# Patient Record
Sex: MALE | Race: WHITE | Employment: FULL TIME
[De-identification: names, ages, dates, MRNs, and addresses within clinical notes are randomized per-mention and may not be internally consistent; named-entity substitution may affect disease eponyms.]

---

## 2017-02-17 DIAGNOSIS — F41.9 ANXIETY: Primary | ICD-10-CM

## 2017-02-17 DIAGNOSIS — F17.200 SMOKES AND MOTIVATED TO QUIT: ICD-10-CM

## 2017-02-17 RX ORDER — BUSPIRONE HYDROCHLORIDE 5 MG/1
5 TABLET ORAL 2 TIMES DAILY
Qty: 60 TABLET | Refills: 0 | Status: SHIPPED | OUTPATIENT
Start: 2017-02-17 | End: 2018-02-07 | Stop reason: SDUPTHER

## 2017-02-17 RX ORDER — VARENICLINE TARTRATE 1 MG/1
1 TABLET, FILM COATED ORAL 2 TIMES DAILY
Qty: 60 TABLET | Refills: 3 | Status: SHIPPED | OUTPATIENT
Start: 2017-02-17 | End: 2018-01-05

## 2017-02-23 ENCOUNTER — PATIENT MESSAGE (OUTPATIENT)
Dept: FAMILY MEDICINE CLINIC | Facility: CLINIC | Age: 27
End: 2017-02-23

## 2017-02-23 DIAGNOSIS — L03.119 CELLULITIS OF THIGH: Primary | ICD-10-CM

## 2017-02-23 RX ORDER — CEPHALEXIN 500 MG/1
500 CAPSULE ORAL EVERY 6 HOURS
Qty: 40 CAPSULE | Refills: 0 | Status: SHIPPED | OUTPATIENT
Start: 2017-02-23 | End: 2017-03-05

## 2018-01-05 ENCOUNTER — PATIENT MESSAGE (OUTPATIENT)
Dept: FAMILY MEDICINE CLINIC | Age: 28
End: 2018-01-05

## 2018-01-05 DIAGNOSIS — F17.200 SMOKES AND MOTIVATED TO QUIT: Primary | ICD-10-CM

## 2018-01-05 RX ORDER — BUPROPION HYDROCHLORIDE 150 MG/1
150 TABLET ORAL EVERY MORNING
Qty: 30 TABLET | Refills: 3 | Status: SHIPPED | OUTPATIENT
Start: 2018-01-05 | End: 2018-02-12 | Stop reason: SDUPTHER

## 2018-01-05 NOTE — TELEPHONE ENCOUNTER
From: Cami Askew  To: Ariel Vázquez MD  Sent: 1/5/2018 8:45 AM EST  Subject: Prescription Question    Is there anything else out there to help with smoking?  My insurance stopped cover it and are wanting me to pay 300+ dollars

## 2018-02-07 ENCOUNTER — OFFICE VISIT (OUTPATIENT)
Dept: FAMILY MEDICINE CLINIC | Age: 28
End: 2018-02-07
Payer: COMMERCIAL

## 2018-02-07 VITALS
RESPIRATION RATE: 18 BRPM | DIASTOLIC BLOOD PRESSURE: 99 MMHG | HEIGHT: 71 IN | HEART RATE: 99 BPM | WEIGHT: 267 LBS | SYSTOLIC BLOOD PRESSURE: 149 MMHG | TEMPERATURE: 98 F | BODY MASS INDEX: 37.38 KG/M2

## 2018-02-07 DIAGNOSIS — Z80.0 FAMILY HISTORY OF COLON CANCER: ICD-10-CM

## 2018-02-07 DIAGNOSIS — Z23 NEED FOR DIPHTHERIA-TETANUS-PERTUSSIS (TDAP) VACCINE: ICD-10-CM

## 2018-02-07 DIAGNOSIS — E66.9 OBESITY (BMI 30-39.9): ICD-10-CM

## 2018-02-07 DIAGNOSIS — Z13.6 SCREENING FOR CARDIOVASCULAR CONDITION: ICD-10-CM

## 2018-02-07 DIAGNOSIS — F41.9 ANXIETY: ICD-10-CM

## 2018-02-07 DIAGNOSIS — Z00.00 ANNUAL PHYSICAL EXAM: Primary | ICD-10-CM

## 2018-02-07 DIAGNOSIS — I10 ESSENTIAL HYPERTENSION: ICD-10-CM

## 2018-02-07 DIAGNOSIS — Z82.49 FAMILY HISTORY OF EARLY CAD: ICD-10-CM

## 2018-02-07 PROCEDURE — 99395 PREV VISIT EST AGE 18-39: CPT | Performed by: FAMILY MEDICINE

## 2018-02-07 RX ORDER — BUSPIRONE HYDROCHLORIDE 10 MG/1
10 TABLET ORAL 2 TIMES DAILY PRN
Qty: 60 TABLET | Refills: 2 | Status: SHIPPED | OUTPATIENT
Start: 2018-02-07 | End: 2020-05-19

## 2018-02-07 RX ORDER — ATENOLOL 25 MG/1
25 TABLET ORAL DAILY
Qty: 30 TABLET | Refills: 0 | Status: SHIPPED | OUTPATIENT
Start: 2018-02-07 | End: 2018-02-14 | Stop reason: SDUPTHER

## 2018-02-07 ASSESSMENT — ENCOUNTER SYMPTOMS
RHINORRHEA: 0
WHEEZING: 0
VOMITING: 0
CONSTIPATION: 0
SHORTNESS OF BREATH: 0
NAUSEA: 0
COUGH: 0
SINUS PAIN: 0
ABDOMINAL PAIN: 0
ABDOMINAL DISTENTION: 0
CHEST TIGHTNESS: 0
DIARRHEA: 0

## 2018-02-07 ASSESSMENT — PATIENT HEALTH QUESTIONNAIRE - PHQ9
SUM OF ALL RESPONSES TO PHQ QUESTIONS 1-9: 0
2. FEELING DOWN, DEPRESSED OR HOPELESS: 0
SUM OF ALL RESPONSES TO PHQ9 QUESTIONS 1 & 2: 0
1. LITTLE INTEREST OR PLEASURE IN DOING THINGS: 0

## 2018-02-07 NOTE — PROGRESS NOTES
DIABETES and HYPERTENSION visit    BP Readings from Last 3 Encounters:   10/27/16 147/83   10/21/16 (!) 154/96   12/30/15 145/88      No results found for: LABA1C, LABMICR, LDLCHOLESTEROL, LDLCALC, HDL, BUN, CREATININE, GLUCOSE         Have you changed or started any medications since your last visit including any over-the-counter medicines, vitamins, or herbal medicines? no   Have you stopped taking any of your medications? Is so, why? -  Yes, all of them  Are you having any side effects from any of your medications? - no    Have you seen any other physician or provider since your last visit?  no   Have you had any other diagnostic tests since your last visit?  no   Have you been seen in the emergency room and/or had an admission in a hospital since we last saw you? No--urgent care   Have you had your routine dental cleaning in the past 6 months?  no     Have you had your annual diabetic retinal (eye) exam? no  (ensure copy of exam is in the chart)    Do you have an active InvoTekhart account? If no, what is the barrier?   Yes    Patient Care Team:  Alexander Treadwell MD as PCP - General (Family Medicine)    Medical History Review  Past Medical, Family, and Social History reviewed and does contribute to the patient presenting condition    Health Maintenance   Topic Date Due    Potassium monitoring  1989    Creatinine monitoring  1989    HIV screen  12/09/2004    DTaP/Tdap/Td vaccine (1 - Tdap) 12/09/2008    Flu vaccine (1) 09/01/2017

## 2018-02-07 NOTE — PATIENT INSTRUCTIONS
social groups, or volunteer to help others. Being alone sometimes makes things seem worse than they are. · Get at least 30 minutes of exercise on most days of the week to relieve stress. Walking is a good choice. You also may want to do other activities, such as running, swimming, cycling, or playing tennis or team sports. Relaxation techniques  Do relaxation exercises 10 to 20 minutes a day. You can play soothing, relaxing music while you do them, if you wish. · Tell others in your house that you are going to do your relaxation exercises. Ask them not to disturb you. · Find a comfortable place, away from all distractions and noise. · Lie down on your back, or sit with your back straight. · Focus on your breathing. Make it slow and steady. · Breathe in through your nose. Breathe out through either your nose or mouth. · Breathe deeply, filling up the area between your navel and your rib cage. Breathe so that your belly goes up and down. · Do not hold your breath. · Breathe like this for 5 to 10 minutes. Notice the feeling of calmness throughout your whole body. As you continue to breathe slowly and deeply, relax by doing the following for another 5 to 10 minutes:  · Tighten and relax each muscle group in your body. You can begin at your toes and work your way up to your head. · Imagine your muscle groups relaxing and becoming heavy. · Empty your mind of all thoughts. · Let yourself relax more and more deeply. · Become aware of the state of calmness that surrounds you. · When your relaxation time is over, you can bring yourself back to alertness by moving your fingers and toes and then your hands and feet and then stretching and moving your entire body. Sometimes people fall asleep during relaxation, but they usually wake up shortly afterward. · Always give yourself time to return to full alertness before you drive a car or do anything that might cause an accident if you are not fully alert.  Never sugars to 5 servings or less a week. A serving is 1 tablespoon jelly or jam, ½ cup sorbet, or 1 cup of lemonade. · Eat less than 2,300 milligrams (mg) of sodium a day. If you limit your sodium to 1,500 mg a day, you can lower your blood pressure even more. Tips for success  · Start small. Do not try to make dramatic changes to your diet all at once. You might feel that you are missing out on your favorite foods and then be more likely to not follow the plan. Make small changes, and stick with them. Once those changes become habit, add a few more changes. · Try some of the following:  ¨ Make it a goal to eat a fruit or vegetable at every meal and at snacks. This will make it easy to get the recommended amount of fruits and vegetables each day. ¨ Try yogurt topped with fruit and nuts for a snack or healthy dessert. ¨ Add lettuce, tomato, cucumber, and onion to sandwiches. ¨ Combine a ready-made pizza crust with low-fat mozzarella cheese and lots of vegetable toppings. Try using tomatoes, squash, spinach, broccoli, carrots, cauliflower, and onions. ¨ Have a variety of cut-up vegetables with a low-fat dip as an appetizer instead of chips and dip. ¨ Sprinkle sunflower seeds or chopped almonds over salads. Or try adding chopped walnuts or almonds to cooked vegetables. ¨ Try some vegetarian meals using beans and peas. Add garbanzo or kidney beans to salads. Make burritos and tacos with mashed jon beans or black beans. Where can you learn more? Go to https://PoshlypeSolarus.Imagry. org and sign in to your Global Animationz account. Enter M378 in the [facility] box to learn more about \"DASH Diet: Care Instructions. \"     If you do not have an account, please click on the \"Sign Up Now\" link. Current as of: September 21, 2016  Content Version: 11.5  © 7702-6634 Healthwise, Comprimato. Care instructions adapted under license by [facility] ([facility]).  If you have questions about a medical condition or this (STIs). Ask whether you should have tests for STIs. You may be at risk if you have sex with more than one person, especially if your partners do not wear condoms. For men  · Tests for sexually transmitted infections (STIs). Ask whether you should have tests for STIs. You may be at risk if you have sex with more than one person, especially if you do not wear a condom. · Testicular cancer exam. Ask your doctor whether you should check your testicles regularly. · Prostate exam. Talk to your doctor about whether you should have a blood test (called a PSA test) for prostate cancer. Experts differ on whether and when men should have this test. Some experts suggest it if you are older than 39 and are -American or have a father or brother who got prostate cancer when he was younger than 72. When should you call for help? Watch closely for changes in your health, and be sure to contact your doctor if you have any problems or symptoms that concern you. Where can you learn more? Go to https://Local Plant Sourcekyle.healthFormspring. org and sign in to your marshallindex account. Enter P072 in the [facility] box to learn more about \"Well Visit, Ages 25 to 48: Care Instructions. \"     If you do not have an account, please click on the \"Sign Up Now\" link. Current as of: May 12, 2017  Content Version: 11.5  © 7136-1047 Healthwise, Incorporated. Care instructions adapted under license by [facility] [address] If you have questions about a medical condition or this instruction, always ask your healthcare professional. [place] [address] any warranty or liability for your use of this information.

## 2018-02-07 NOTE — PROGRESS NOTES
FERRITIN    No results found for: VITD25      HIV done , in [place], patient declines to have it rechecked        Past Medical History:   Diagnosis Date    Anxiety 2/7/2018    Gastroesophageal reflux disease without esophagitis 10/22/2016    HTN (hypertension) 11/30/15    Obesity      Past Surgical History:   Procedure Laterality Date    COLONOSCOPY  1998    rectal bleeding, no anesthesia, lasted 5 minutes, will clarify with mom if it was really a colonoscopy or not    TONSILLECTOMY  1996    UPPER GASTROINTESTINAL ENDOSCOPY  2005     Family History   Problem Relation Age of Onset    Diabetes Mother 35    High Blood Pressure Mother     Diabetes Father     High Blood Pressure Father     Heart Attack Father 39    Diabetes Maternal Grandmother     High Blood Pressure Maternal Grandmother     Colon Cancer Maternal Grandmother 58    Diabetes Maternal Grandfather     Heart Attack Maternal Grandfather 48     X 2     High Blood Pressure Maternal Grandfather     Stroke Maternal Grandfather     Heart Attack Paternal Grandmother     Cancer Paternal Grandmother      unknown what type/age    High Blood Pressure Paternal Grandmother     Heart Attack Paternal Grandfather     High Blood Pressure Paternal Grandfather     Heart Disease Paternal Grandfather      Social History   Substance Use Topics    Smoking status: Former Smoker     Packs/day: 1.50     Years: 8.00     Types: Cigarettes     Start date: 1/1/2007     Quit date: 6/1/2013    Smokeless tobacco: Never Used      Comment: trying to quit, uses electronic cig with nicotine, Chantix;QUIT smoking 3/2016; restarted smoking Sept 2016    Alcohol use 12.0 oz/week     20 Cans of beer per week     -vital signs stable and within normal limits except high BP, mild tachycardia   and Obesity per BMI.      BP (!) 149/99   Pulse 99   Temp 98 °F (36.7 °C) (Oral)   Resp 18   Ht 5' 11\" (1.803 m)   Wt 267 lb (121.1 kg)   BMI 37.24 kg/m²   Body mass index is 37.24 kg/m². Current Outpatient Prescriptions   Medication Sig Dispense Refill    buPROPion (WELLBUTRIN XL) 150 MG extended release tablet Take 1 tablet by mouth every morning 30 tablet 3    mupirocin (BACTROBAN) 2 % ointment Apply topically 3 times daily on the affected area 1 Tube 0    busPIRone (BUSPAR) 5 MG tablet Take 1 tablet by mouth 2 times daily 60 tablet 0    diltiazem (CARTIA XT) 240 MG extended release capsule Take 1 capsule by mouth daily STOP CARDIZEM 180 MG 30 capsule 3    cetirizine (ZYRTEC ALLERGY) 10 MG tablet Take 1 tablet by mouth daily 30 tablet 3    fluticasone (FLONASE) 50 MCG/ACT nasal spray 2 sprays by Nasal route daily 1 Bottle 0    omeprazole (PRILOSEC) 20 MG delayed release capsule Take 1 capsule by mouth Daily 30 capsule 3    Blood Pressure KIT Dx: HTN. Needs automatic blood pressure machine to monitor his blood pressure. 1 kit 0    Multiple Vitamins-Minerals (THERAPEUTIC MULTIVITAMIN-MINERALS) tablet Take 1 tablet by mouth daily      aspirin 81 MG chewable tablet Take 81 mg by mouth daily       No current facility-administered medications for this visit.              -rest of complaints with corresponding details per ROS    The patient's past medical, surgical, social, and family history as well as his current medications and allergies were reviewed as documented in today's encounter. Review of Systems   Constitutional: Negative for activity change, appetite change, chills, diaphoresis, fatigue, fever and unexpected weight change. HENT: Positive for postnasal drip. Negative for congestion, dental problem, hearing loss, rhinorrhea and sinus pain. Eyes: Positive for visual disturbance (has glasses). Respiratory: Negative for cough, chest tightness, shortness of breath and wheezing. Cardiovascular: Positive for palpitations. Negative for chest pain and leg swelling.         Palpitations when having anxiety    Gastrointestinal: Negative for abdominal distention, abdominal pain, constipation, diarrhea, nausea and vomiting. Endocrine: Negative for cold intolerance, heat intolerance, polydipsia, polyphagia and polyuria. Genitourinary: Negative for difficulty urinating and frequency. Musculoskeletal: Negative for arthralgias. Skin: Negative for rash. Neurological: Negative for dizziness and headaches. Psychiatric/Behavioral: Negative for dysphoric mood, self-injury and sleep disturbance. The patient is nervous/anxious. Physical Exam   Constitutional: He is oriented to person, place, and time. He appears well-developed and well-nourished. No distress. HENT:   Head: Normocephalic and atraumatic. Right Ear: No middle ear effusion. Left Ear:  No middle ear effusion. Nose: Mucosal edema present. No rhinorrhea. Right sinus exhibits no maxillary sinus tenderness and no frontal sinus tenderness. Left sinus exhibits no maxillary sinus tenderness and no frontal sinus tenderness. Mouth/Throat: Posterior oropharyngeal erythema present. Advised to let me know if he wants to restart antihistaminic at any time   Eyes: Conjunctivae and EOM are normal. Right eye exhibits no discharge. Left eye exhibits no discharge. No scleral icterus. Neck: Normal range of motion. Neck supple. No thyromegaly present. Cardiovascular: Normal rate, regular rhythm, normal heart sounds and intact distal pulses. No murmur heard. Pulmonary/Chest: Effort normal and breath sounds normal. No respiratory distress. He has no wheezes. He has no rales. He exhibits no tenderness. Abdominal: Soft. Bowel sounds are normal. He exhibits no distension. There is no tenderness. Obese abdomen. Musculoskeletal: Normal range of motion. He exhibits no edema or tenderness. Neurological: He is alert and oriented to person, place, and time. He has normal reflexes. No cranial nerve deficit. He exhibits normal muscle tone.  Coordination normal.   Skin: Skin is warm and 3 month(s) with PCP. Provider spent 13 minutes counseling patient. 5. Screening for cardiovascular condition  - Lipid Panel; Future    6. Need for diphtheria-tetanus-pertussis (Tdap) vaccine  - Tetanus-Diphth-Acell Pertussis (BOOSTRIX) 5-2.5-18.5 LF-MCG/0.5 injection; Inject 0.5 mLs into the muscle once for 1 dose  Dispense: 0.5 mL; Refill: 0    7. Family history of early CAD, father had MI at 40 yo  - EKG 12 Lead; Future    8. Family history of colon cancer in [place]  Advised to start colonoscopy at 35years old. Orders Placed This Encounter   Procedures    CBC     Standing Status:   Future     Standing Expiration Date:   2/7/2019    Comprehensive Metabolic Panel     Standing Status:   Future     Standing Expiration Date:   2/7/2019    TSH without Reflex     Standing Status:   Future     Standing Expiration Date:   2/8/2019    Lipid Panel     Standing Status:   Future     Standing Expiration Date:   2/7/2019     Order Specific Question:   Is Patient Fasting?/# of Hours     Answer:   8-10 Hours    EKG 12 Lead     Standing Status:   Future     Standing Expiration Date:   2/7/2019     Scheduling Instructions:      fam history of early CAD     Order Specific Question:   Reason for Exam?     Answer:   Hypertension     Order Specific Question:   Reason for Exam?     Answer:    Other       Medications Discontinued During This Encounter   Medication Reason    mupirocin (BACTROBAN) 2 % ointment Therapy completed    diltiazem (CARTIA XT) 240 MG extended release capsule Alternate therapy    cetirizine (ZYRTEC ALLERGY) 10 MG tablet Therapy completed    omeprazole (PRILOSEC) 20 MG delayed release capsule Therapy completed    Blood Pressure KIT Cost of medication    busPIRone (BUSPAR) 5 MG tablet Reorder         Noman received counseling on the following healthy behaviors: nutrition, exercise, medication adherence and weight loss  Reviewed prior labs and health maintenance  Continue current medications, diet about 3 months (around 5/7/2018) for HTN, LABS F/U. Patient was seen with total face to face time of  30 minutes. More than 50% of this visit was counseling and education. Future Appointments  Date Time Provider Christine Gray   2/14/2018 3:45 PM SCHEDULE, MARYA [facility]   5/9/2018 3:00 PM Ata Case MD [facility]       This note was completed by using the assistance of a speech-recognition program. However, inadvertent computerized transcription errors may be present. Although every effort was made to ensure accuracy, no guarantees can be provided that every mistake has been identified and corrected by editing.   Electronically signed by Ata Case MD on 2/7/2018 at 6:15 PM

## 2018-02-09 ENCOUNTER — PATIENT MESSAGE (OUTPATIENT)
Dept: FAMILY MEDICINE CLINIC | Age: 28
End: 2018-02-09

## 2018-02-09 DIAGNOSIS — F17.200 SMOKES AND MOTIVATED TO QUIT: ICD-10-CM

## 2018-02-12 ENCOUNTER — PATIENT MESSAGE (OUTPATIENT)
Dept: FAMILY MEDICINE CLINIC | Age: 28
End: 2018-02-12

## 2018-02-12 DIAGNOSIS — F17.200 SMOKES AND MOTIVATED TO QUIT: ICD-10-CM

## 2018-02-12 RX ORDER — BUPROPION HYDROCHLORIDE 150 MG/1
150 TABLET ORAL EVERY MORNING
Qty: 90 TABLET | Refills: 3 | Status: SHIPPED | OUTPATIENT
Start: 2018-02-12 | End: 2018-03-19 | Stop reason: SDUPTHER

## 2018-02-12 RX ORDER — BUPROPION HYDROCHLORIDE 150 MG/1
150 TABLET ORAL EVERY MORNING
Qty: 90 TABLET | Refills: 3 | Status: SHIPPED | OUTPATIENT
Start: 2018-02-12 | End: 2018-02-12 | Stop reason: SDUPTHER

## 2018-02-12 NOTE — TELEPHONE ENCOUNTER
From: Wilma Askew  To: Divine Martinez MD  Sent: 2/12/2018 2:37 PM EST  Subject: Prescription Question    My blood pressure medicine and anxiety medicine is cheaper at [facility], but my wellbutrin is cheaper at [facility]. Can I refill the wellbutrin at [facility] and keep everything else the same?

## 2018-02-12 NOTE — TELEPHONE ENCOUNTER
From: Leydi Askew  To: Praful Pryor MD  Sent: 2/12/2018 3:26 PM EST  Subject: Prescription Question    [facility] [address]  ----- Message -----  From: Praful Pryor MD  Sent: 2/12/2018 3:22 PM EST  To: Leydi Askew  Subject: RE: Prescription Question  Yes, you can have 2 pharmacies. What [facility] and I will resend the Wellbutrin?      ----- Message -----   From: Leydi Askew   Sent: 2/12/2018 2:37 PM EST   To: Praful Pryor MD  Subject: Prescription Question    My blood pressure medicine and anxiety medicine is cheaper at [facility], but my wellbutrin is cheaper at [facility]. Can I refill the wellbutrin at [facility] and keep everything else the same?

## 2018-02-14 ENCOUNTER — NURSE ONLY (OUTPATIENT)
Dept: FAMILY MEDICINE CLINIC | Age: 28
End: 2018-02-14
Payer: COMMERCIAL

## 2018-02-14 VITALS — HEART RATE: 86 BPM | SYSTOLIC BLOOD PRESSURE: 138 MMHG | DIASTOLIC BLOOD PRESSURE: 96 MMHG

## 2018-02-14 DIAGNOSIS — R00.0 TACHYCARDIA: ICD-10-CM

## 2018-02-14 DIAGNOSIS — I10 ESSENTIAL HYPERTENSION: Primary | ICD-10-CM

## 2018-02-14 PROCEDURE — 99211 OFF/OP EST MAY X REQ PHY/QHP: CPT | Performed by: FAMILY MEDICINE

## 2018-02-14 RX ORDER — ATENOLOL 50 MG/1
50 TABLET ORAL DAILY
Qty: 30 TABLET | Refills: 1 | Status: SHIPPED | OUTPATIENT
Start: 2018-02-14 | End: 2020-05-19

## 2018-02-16 ENCOUNTER — TELEPHONE (OUTPATIENT)
Dept: FAMILY MEDICINE CLINIC | Age: 28
End: 2018-02-16

## 2018-03-19 DIAGNOSIS — F17.200 SMOKES AND MOTIVATED TO QUIT: ICD-10-CM

## 2018-03-19 RX ORDER — BUPROPION HYDROCHLORIDE 150 MG/1
150 TABLET ORAL EVERY MORNING
Qty: 90 TABLET | Refills: 3 | Status: SHIPPED | OUTPATIENT
Start: 2018-03-19 | End: 2018-04-27 | Stop reason: SDUPTHER

## 2018-04-27 DIAGNOSIS — F17.200 SMOKES AND MOTIVATED TO QUIT: ICD-10-CM

## 2018-04-27 RX ORDER — BUPROPION HYDROCHLORIDE 150 MG/1
150 TABLET ORAL EVERY MORNING
Qty: 30 TABLET | Refills: 3 | Status: SHIPPED | OUTPATIENT
Start: 2018-04-27 | End: 2018-06-04 | Stop reason: SDUPTHER

## 2018-06-04 DIAGNOSIS — F17.200 SMOKES AND MOTIVATED TO QUIT: ICD-10-CM

## 2018-06-04 RX ORDER — BUPROPION HYDROCHLORIDE 150 MG/1
150 TABLET ORAL EVERY MORNING
Qty: 30 TABLET | Refills: 3 | Status: SHIPPED | OUTPATIENT
Start: 2018-06-04 | End: 2018-07-24 | Stop reason: SDUPTHER

## 2018-07-24 DIAGNOSIS — F17.200 SMOKES AND MOTIVATED TO QUIT: ICD-10-CM

## 2018-07-24 RX ORDER — BUPROPION HYDROCHLORIDE 150 MG/1
150 TABLET ORAL EVERY MORNING
Qty: 90 TABLET | Refills: 3 | Status: SHIPPED | OUTPATIENT
Start: 2018-07-24 | End: 2018-12-11 | Stop reason: SDUPTHER

## 2018-07-24 NOTE — TELEPHONE ENCOUNTER
From: Zonia Marcano  Sent: 7/24/2018 9:07 AM EDT  Subject: Medication Renewal Request    Elizabet Ogden.  Jamilah would like a refill of the following medications:     buPROPion (WELLBUTRIN XL) 150 MG extended release tablet Drew Thompson MD]    Preferred pharmacy: [facility] [id number] Tomy Fuentes [address] [place] [address] [phone] - F [phone]

## 2018-12-11 DIAGNOSIS — F17.200 SMOKES AND MOTIVATED TO QUIT: ICD-10-CM

## 2018-12-11 RX ORDER — BUPROPION HYDROCHLORIDE 150 MG/1
150 TABLET ORAL EVERY MORNING
Qty: 90 TABLET | Refills: 3 | Status: SHIPPED | OUTPATIENT
Start: 2018-12-11 | End: 2020-05-19

## 2019-01-24 ENCOUNTER — HOSPITAL ENCOUNTER (EMERGENCY)
Facility: CLINIC | Age: 29
Discharge: HOME | End: 2019-01-24
Attending: EMERGENCY MEDICINE
Payer: COMMERCIAL

## 2019-01-24 VITALS
OXYGEN SATURATION: 96 % | TEMPERATURE: 98.6 F | WEIGHT: 245 LBS | RESPIRATION RATE: 18 BRPM | SYSTOLIC BLOOD PRESSURE: 162 MMHG | DIASTOLIC BLOOD PRESSURE: 107 MMHG | HEIGHT: 71 IN | HEART RATE: 92 BPM | BODY MASS INDEX: 34.3 KG/M2

## 2019-01-24 DIAGNOSIS — J06.9 ACUTE UPPER RESPIRATORY INFECTION: Primary | ICD-10-CM

## 2019-01-24 DIAGNOSIS — H65.01 RIGHT ACUTE SEROUS OTITIS MEDIA, RECURRENCE NOT SPECIFIED: ICD-10-CM

## 2019-01-24 PROCEDURE — 99283 EMERGENCY DEPT VISIT LOW MDM: CPT

## 2019-01-24 RX ORDER — BENZONATATE 100 MG/1
100 CAPSULE ORAL 3 TIMES DAILY PRN
Qty: 30 CAPSULE | Refills: 0 | Status: SHIPPED | OUTPATIENT
Start: 2019-01-24 | End: 2019-01-31

## 2019-01-24 RX ORDER — AMOXICILLIN 500 MG/1
500 CAPSULE ORAL 3 TIMES DAILY
Qty: 30 CAPSULE | Refills: 0 | Status: SHIPPED | OUTPATIENT
Start: 2019-01-24 | End: 2019-02-03

## 2019-01-24 ASSESSMENT — ENCOUNTER SYMPTOMS
COLOR CHANGE: 0
EYE REDNESS: 0
STRIDOR: 0
ABDOMINAL PAIN: 0
SHORTNESS OF BREATH: 0
WHEEZING: 0
EYE DISCHARGE: 0
SORE THROAT: 0
DIARRHEA: 0
VOMITING: 0
NAUSEA: 0
COUGH: 1
EYE PAIN: 0
CONSTIPATION: 0

## 2019-01-25 ENCOUNTER — TELEPHONE (OUTPATIENT)
Dept: FAMILY MEDICINE CLINIC | Age: 29
End: 2019-01-25

## 2020-03-19 ENCOUNTER — OFFICE VISIT (OUTPATIENT)
Dept: FAMILY MEDICINE CLINIC | Age: 30
End: 2020-03-19
Payer: COMMERCIAL

## 2020-03-19 ENCOUNTER — HOSPITAL ENCOUNTER (OUTPATIENT)
Age: 30
Setting detail: SPECIMEN
Discharge: HOME | End: 2020-03-19
Payer: COMMERCIAL

## 2020-03-19 VITALS
HEIGHT: 71 IN | HEART RATE: 98 BPM | TEMPERATURE: 99.2 F | OXYGEN SATURATION: 97 % | DIASTOLIC BLOOD PRESSURE: 93 MMHG | WEIGHT: 239 LBS | SYSTOLIC BLOOD PRESSURE: 146 MMHG | BODY MASS INDEX: 33.46 KG/M2

## 2020-03-19 LAB
INFLUENZA A ANTIBODY: NORMAL
INFLUENZA B ANTIBODY: NORMAL

## 2020-03-19 PROCEDURE — G8427 DOCREV CUR MEDS BY ELIG CLIN: HCPCS | Performed by: NURSE PRACTITIONER

## 2020-03-19 PROCEDURE — G8417 CALC BMI ABV UP PARAM F/U: HCPCS | Performed by: NURSE PRACTITIONER

## 2020-03-19 PROCEDURE — 4004F PT TOBACCO SCREEN RCVD TLK: CPT | Performed by: NURSE PRACTITIONER

## 2020-03-19 PROCEDURE — G8484 FLU IMMUNIZE NO ADMIN: HCPCS | Performed by: NURSE PRACTITIONER

## 2020-03-19 PROCEDURE — 99202 OFFICE O/P NEW SF 15 MIN: CPT | Performed by: NURSE PRACTITIONER

## 2020-03-19 PROCEDURE — 87804 INFLUENZA ASSAY W/OPTIC: CPT | Performed by: NURSE PRACTITIONER

## 2020-03-19 RX ORDER — DOXYCYCLINE HYCLATE 100 MG
100 TABLET ORAL 2 TIMES DAILY
Qty: 14 TABLET | Refills: 0 | Status: SHIPPED | OUTPATIENT
Start: 2020-03-19 | End: 2020-03-26

## 2020-03-19 RX ORDER — GUAIFENESIN AND CODEINE PHOSPHATE 100; 10 MG/5ML; MG/5ML
5 SOLUTION ORAL EVERY 4 HOURS PRN
Qty: 120 ML | Refills: 0 | Status: SHIPPED | OUTPATIENT
Start: 2020-03-19 | End: 2020-03-24

## 2020-03-19 RX ORDER — ALBUTEROL SULFATE 90 UG/1
2 AEROSOL, METERED RESPIRATORY (INHALATION) EVERY 6 HOURS PRN
Qty: 1 INHALER | Refills: 0 | Status: SHIPPED | OUTPATIENT
Start: 2020-03-19 | End: 2020-05-19

## 2020-03-19 ASSESSMENT — ENCOUNTER SYMPTOMS
SORE THROAT: 0
RHINORRHEA: 1
SHORTNESS OF BREATH: 1
CHEST TIGHTNESS: 0
SINUS PAIN: 0
COUGH: 1
SINUS PRESSURE: 0
WHEEZING: 0

## 2020-03-19 ASSESSMENT — PATIENT HEALTH QUESTIONNAIRE - PHQ9: DEPRESSION UNABLE TO ASSESS: PT REFUSES

## 2020-03-19 NOTE — PROGRESS NOTES
chest  [place] [address] [facility]   [address] [address]  Dept: [phone]  Dept Fax: [phone]    Preston Frost is a 26 y.o. male who presents to the urgent care today for his medicalconditions/complaints as noted below. Preston Frost is c/o of Cough (x 4 weeks, when coughing feels short of breath, passed out last night when having a coughing fit)      HPI:       79-year-old male patient presents with complaint of cough, congestion. Patient has had symptoms for approximately 4 weeks. Patient was previously seen at an urgent care setting diagnosed with bronchitis and treated with Zithromax, Bromfed. Patient reports that he has had some improvement of symptoms however symptoms have recurred over the past 3 to 4 days. Patient denies significant respiratory diagnosis, however is a smoker. Denies ear pain, sinus pain, sore throat. Relieving factors include none. Worsening factors include none. Denies any specific sick contacts.       Past Medical History:   Diagnosis Date    Anxiety 2/7/2018    Gastroesophageal reflux disease without esophagitis 10/22/2016    HTN (hypertension) 11/30/15    Obesity         Current Outpatient Medications   Medication Sig Dispense Refill    buPROPion (WELLBUTRIN XL) 150 MG extended release tablet Take 1 tablet by mouth every morning (Patient not taking: Reported on 3/19/2020) 90 tablet 3    atenolol (TENORMIN) 50 MG tablet Take 1 tablet by mouth daily ** stop cardizem** BLOOD PRESSURE MEDICATION (Patient not taking: Reported on 3/19/2020) 30 tablet 1    busPIRone (BUSPAR) 10 MG tablet Take 1 tablet by mouth 2 times daily as needed (anxiety) (Patient not taking: Reported on 3/19/2020) 60 tablet 2    fluticasone (FLONASE) 50 MCG/ACT nasal spray 2 sprays by Nasal route daily (Patient not taking: Reported on 3/19/2020) 1 Bottle 0    Multiple Vitamins-Minerals (THERAPEUTIC MULTIVITAMIN-MINERALS) tablet Take 1 Influenza B Ab neg      EXAMINATION:   TWO XRAY VIEWS OF THE CHEST       3/19/2020 10:16 am       COMPARISON:   None.       HISTORY:   ORDERING SYSTEM PROVIDED HISTORY: Cough       FINDINGS:   Shallow inflation.  The cardiomediastinal silhouette is within normal limits. There is no consolidation, pneumothorax or evidence for edema. No evidence   for effusion.  No acute osseous abnormality is identified.           Impression   Shallow inflation without acute airspace disease identified. We will treat his reactive airway disease secondary to smoking with oral antibiotic, inhaler, cough syrup. Recommend rest, fluids, Tylenol/Motrin PRN  Discussed to follow up here or at an ER if sx worsen or persist.  Pt agreeable to plan. Patient given educational materials - see patient instructions. Discussed use, benefit, and side effects of prescribed medications. All patientquestions answered. Pt voiced understanding. This note was transcribed using dictation with Dragon services. Efforts were made to correct any errors but some words may be misinterpreted.      Electronically signed by SUMI Lopez CNP on 3/19/2020at 10:14 AM

## 2020-03-19 NOTE — PATIENT INSTRUCTIONS
Patient Education        Cough: Care Instructions  Your Care Instructions    A cough is your body's response to something that bothers your throat or airways. Many things can cause a cough. You might cough because of a cold or the flu, bronchitis, or asthma. Smoking, postnasal drip, allergies, and stomach acid that backs up into your throat also can cause coughs. A cough is a symptom, not a disease. Most coughs stop when the cause, such as a cold, goes away. You can take a few steps at home to cough less and feel better. Follow-up care is a key part of your treatment and safety. Be sure to make and go to all appointments, and call your doctor if you are having problems. It's also a good idea to know your test results and keep a list of the medicines you take. How can you care for yourself at home? · Drink lots of water and other fluids. This helps thin the mucus and soothes a dry or sore throat. Honey or lemon juice in hot water or tea may ease a dry cough. · Take cough medicine as directed by your doctor. · Prop up your head on pillows to help you breathe and ease a dry cough. · Try cough drops to soothe a dry or sore throat. Cough drops don't stop a cough. Medicine-flavored cough drops are no better than candy-flavored drops or hard candy. · Do not smoke. Avoid secondhand smoke. If you need help quitting, talk to your doctor about stop-smoking programs and medicines. These can increase your chances of quitting for good. When should you call for help? Call 911 anytime you think you may need emergency care.  For example, call if:    · You have severe trouble breathing.    Call your doctor now or seek immediate medical care if:    · You cough up blood.     · You have new or worse trouble breathing.     · You have a new or higher fever.     · You have a new rash.    Watch closely for changes in your health, and be sure to contact your doctor if:    · You cough more deeply or more often, especially if you notice more mucus or a change in the color of your mucus.     · You have new symptoms, such as a sore throat, an earache, or sinus pain.     · You do not get better as expected. Where can you learn more? Go to https://chpedonnyeb.Wheeldo. org and sign in to your Mingle360 account. Enter D279 in the [employer] box to learn more about \"Cough: Care Instructions. \"     If you do not have an account, please click on the \"Sign Up Now\" link. Current as of: June 9, 2019Content Version: 12.4  © 9372-8016 Healthwise, Incorporated. Care instructions adapted under license by [facility] ([facility]). If you have questions about a medical condition or this instruction, always ask your healthcare professional. [address] any warranty or liability for your use of this information.

## 2020-05-19 ENCOUNTER — OFFICE VISIT (OUTPATIENT)
Dept: FAMILY MEDICINE CLINIC | Age: 30
End: 2020-05-19
Payer: COMMERCIAL

## 2020-05-19 VITALS
BODY MASS INDEX: 35 KG/M2 | HEART RATE: 97 BPM | DIASTOLIC BLOOD PRESSURE: 100 MMHG | HEIGHT: 71 IN | WEIGHT: 250 LBS | OXYGEN SATURATION: 95 % | SYSTOLIC BLOOD PRESSURE: 146 MMHG | TEMPERATURE: 97.2 F

## 2020-05-19 PROCEDURE — 99395 PREV VISIT EST AGE 18-39: CPT | Performed by: FAMILY MEDICINE

## 2020-05-19 RX ORDER — BUPROPION HYDROCHLORIDE 150 MG/1
150 TABLET ORAL EVERY MORNING
Qty: 90 TABLET | Refills: 3 | Status: SHIPPED | OUTPATIENT
Start: 2020-05-19

## 2020-05-19 RX ORDER — AMLODIPINE BESYLATE 10 MG/1
10 TABLET ORAL DAILY
Qty: 90 TABLET | Refills: 3 | Status: SHIPPED | OUTPATIENT
Start: 2020-05-19

## 2020-05-19 ASSESSMENT — PATIENT HEALTH QUESTIONNAIRE - PHQ9
SUM OF ALL RESPONSES TO PHQ9 QUESTIONS 1 & 2: 0
1. LITTLE INTEREST OR PLEASURE IN DOING THINGS: 0
2. FEELING DOWN, DEPRESSED OR HOPELESS: 0
SUM OF ALL RESPONSES TO PHQ QUESTIONS 1-9: 0
SUM OF ALL RESPONSES TO PHQ QUESTIONS 1-9: 0

## 2020-05-19 ASSESSMENT — ENCOUNTER SYMPTOMS
WHEEZING: 0
SHORTNESS OF BREATH: 0
NAUSEA: 0
CHEST TIGHTNESS: 0
BACK PAIN: 0
ABDOMINAL DISTENTION: 0
VOMITING: 0
DIARRHEA: 0
ABDOMINAL PAIN: 0
COUGH: 0
CONSTIPATION: 0

## 2020-05-19 NOTE — PROGRESS NOTES
Chief Complaint   Patient presents with    Annual Exam    Hypertension        Kj Hernandez comes today for annual exam.    Current concerns: HTN  Sexually active: Yes    Wears seatbelts: yes    Regular exercise: yes, walking a lot      Ever been transfused or tattooed?: yes    Last eye exam: up to date : Yes  Eye exam last yr, glasses    Abnormal visual screening. I advised Oc Clark to make appointment with ophthalmologist. The patient verbalizes understanding and agrees with the plan. Visual Acuity Screening    Right eye Left eye Both eyes   Without correction: 20/13 20/15 20/13   With correction:          Hearing: normal: Yes    Last dental exam and preventative dental cleaning: up to date : No: I advised Oc Clark to make appointment with Ophthalmology . The patient verbalizes understanding and agrees with the plan. Nutritional assessment: Body mass index is 34.88 kg/m². Elevated. Weight is   unintentional weight gain   Wt Readings from Last 3 Encounters:   05/19/20 250 lb (113.4 kg)   03/19/20 239 lb (108.4 kg)   01/24/19 245 lb (111.1 kg)       Healthful diet and Physical activity counseling to prevent CVD- low carb, low fat diet, increase fruits and vegetables, and exercise 4-5 times a day 30-40 minutes a day discussed    Nicotine dependence: yes - 1 PPD  Smoker, counseling given to quit smoking. Would like to restart Wellbutrin again as it was helping him with the cravings    Ready to quit: Yes  Counseling given: Yes  Comment: trying to quit       Alcohol use: yes - weekends, binge drinking with his friends. Does not drink throughout the weekdays      There is no immunization history on file for this patient.     Tdap onetime, then Td every 10 years-up to date: No: , declines    Influenza annually-up to date: No: declines    PPSV 23 in all adults 19-63 yo with chronic conditions, smokers, alcoholism,  nursing home residents; then PCV 13 at 65 yo-up to date: No: declines    Colonoscopy recommended at 35  The patient has  family history of colon cancer, his grandma     The patient has family history of cancer. No results found for: PSA, PSACASEY    Has known HTN  BP is Elevated. Denies chest pain, chest pressure/discomfort, claudication, dyspnea, exertional chest pressure/discomfort, fatigue, irregular heart beat, lower extremity edema, near-syncope, orthopnea, palpitations, paroxysmal nocturnal dyspnea, syncope and tachypnea      Stopped atenolol 50 mg, ran out more than 1 yr, he does not think atenolol helped him before  BP Readings from Last 3 Encounters:   05/19/20 (!) 146/100   03/19/20 (!) 146/93   01/24/19 (!) 162/107       Pulse is Normal.    Pulse Readings from Last 3 Encounters:   05/19/20 97   03/19/20 98   01/24/19 92       A1c is  none  No results found for: LABA1C    Lipid profile- none available    Hepatitis C screening-N/A       [x]Negative depression screening. Anxiety   Was on Wellbutrin, ran out 5 mo ago, it was helping when was taking it. Buspar gave him lightheadedness. Has been worrying a lot regarding the coronavirus pandemic and being laid off for the past 2 months. He feels anxiety is worsening. Denies suicidal ideation, plan or intent. Negative depression screening    PHQ Scores 5/19/2020 2/7/2018   PHQ2 Score 0 0   PHQ9 Score 0 0         Health Maintenance reviewed -updated labs, declines immunizations, updated varicella immunization status.     Health Maintenance Due   Topic Date Due    Potassium monitoring  1989    Creatinine monitoring  1989    Varicella vaccine (1 of 2 - 2-dose childhood series) 12/09/1990    Pneumococcal 0-64 years Vaccine (1 of 1 - PPSV23) 12/09/1995    DTaP/Tdap/Td vaccine (1 - Tdap) 12/09/2008         Patient Active Problem List    Diagnosis Date Noted    Family history of early CAD, father had MI at 40 yo 01/02/2016     Priority: High    Essential hypertension 11/30/2015     Priority: High    Tachycardia 10/22/2016     Priority: Low    Allergic rhinitis 2015     Priority: Low    Anxiety 2018    Obesity (BMI 30-39.9) 2018    Family history of colon cancer in [facility] 2018    Smokes and motivated to quit 2015         Past Medical History:   Diagnosis Date    Anxiety 2018    Gastroesophageal reflux disease without esophagitis 10/22/2016    HTN (hypertension) 11/30/15    Obesity      Past Surgical History:   Procedure Laterality Date    COLONOSCOPY      rectal bleeding, no anesthesia, lasted 5 minutes, will clarify with mom if it was really a colonoscopy or not    TONSILLECTOMY      UPPER GASTROINTESTINAL ENDOSCOPY       Family History   Problem Relation Age of Onset    Diabetes Mother 35    High Blood Pressure Mother     Diabetes Father     High Blood Pressure Father     Heart Attack Father 39    Diabetes Maternal Grandmother     High Blood Pressure Maternal Grandmother     Colon Cancer Maternal Grandmother 58    Diabetes Maternal Grandfather     Heart Attack Maternal Grandfather 48        X 2     High Blood Pressure Maternal Grandfather     Stroke Maternal Grandfather     Heart Attack Paternal Grandmother     Cancer Paternal Grandmother         unknown what type/age    High Blood Pressure Paternal Grandmother     Heart Attack Paternal Grandfather     High Blood Pressure Paternal Grandfather     Heart Disease Paternal Grandfather      Social History     Tobacco Use    Smoking status: Current Every Day Smoker     Packs/day: 1.50     Years: 8.00     Pack years: 12.00     Types: Cigarettes     Start date: 2007     Last attempt to quit: 2013     Years since quittin.9    Smokeless tobacco: Never Used    Tobacco comment: trying to quit   Substance Use Topics    Alcohol use:  Yes     Alcohol/week: 12.0 standard drinks     Types: 12 Cans of beer per week    Drug use: No         Current Outpatient Medications   Medication Sig Dispense Refill  Multiple Vitamins-Minerals (THERAPEUTIC MULTIVITAMIN-MINERALS) tablet Take 1 tablet by mouth daily       No current facility-administered medications for this visit.              -rest of complaints with corresponding details per ROS    The patient's past medical, surgical, social,and family history as well as his current medications and allergies were reviewed as documented in today's encounter. Review of Systems   Constitutional: Positive for unexpected weight change. Negative for activity change, appetite change, chills, diaphoresis, fatigue and fever. HENT: Negative for congestion, dental problem and hearing loss. Eyes: Positive for visual disturbance. Respiratory: Negative for cough, chest tightness, shortness of breath and wheezing. Cardiovascular: Negative for chest pain, palpitations and leg swelling. Gastrointestinal: Negative for abdominal distention, abdominal pain, constipation, diarrhea, nausea and vomiting. Endocrine: Negative for cold intolerance, heat intolerance, polydipsia, polyphagia and polyuria. Genitourinary: Negative for decreased urine volume, difficulty urinating, frequency and urgency. Musculoskeletal: Negative for back pain. Skin: Negative for rash. Neurological: Negative for dizziness, weakness and headaches. Hematological: Negative for adenopathy. Does not bruise/bleed easily. Psychiatric/Behavioral: Negative for decreased concentration, dysphoric mood and sleep disturbance. The patient is nervous/anxious.          -vital signs stable and within normal limits except obesity per BMI, uncontrolled blood pressure, borderline low pulse ox    BP (!) 146/100   Pulse 97   Temp 97.2 °F (36.2 °C) (Temporal)   Ht 5' 10.98\" (1.803 m)   Wt 250 lb (113.4 kg)   SpO2 95%   BMI 34.88 kg/m²      Physical Exam  Vitals signs and nursing note reviewed. Constitutional:       General: He is not in acute distress. Appearance: He is well-developed. He is obese. He is not diaphoretic. HENT:      Head: Normocephalic and atraumatic. Right Ear: Hearing, tympanic membrane, ear canal and external ear normal.      Left Ear: Hearing, tympanic membrane, ear canal and external ear normal.      Nose: Nose normal. No mucosal edema. Mouth/Throat:      Mouth: Mucous membranes are moist.      Pharynx: No oropharyngeal exudate or posterior oropharyngeal erythema. Eyes:      General: Lids are normal. No scleral icterus. Right eye: No discharge. Left eye: No discharge. Conjunctiva/sclera: Conjunctivae normal.   Neck:      Musculoskeletal: Normal range of motion and neck supple. Thyroid: No thyromegaly. Cardiovascular:      Rate and Rhythm: Normal rate and regular rhythm. Heart sounds: Normal heart sounds. No murmur. Pulmonary:      Effort: Pulmonary effort is normal. No respiratory distress. Breath sounds: Normal breath sounds. No wheezing or rales. Chest:      Chest wall: No tenderness. Abdominal:      General: Bowel sounds are normal. There is no distension. Palpations: Abdomen is soft. There is no hepatomegaly or splenomegaly. Tenderness: There is no abdominal tenderness. Comments: Obese abdomen. Musculoskeletal: Normal range of motion. General: No tenderness. Right lower leg: No edema. Left lower leg: No edema. Skin:     General: Skin is warm and dry. Capillary Refill: Capillary refill takes less than 2 seconds. Findings: No rash. Comments: Scratching at nighttime   Neurological:      Mental Status: He is alert and oriented to person, place, and time. Cranial Nerves: No cranial nerve deficit. Motor: No abnormal muscle tone. Coordination: Coordination normal.      Deep Tendon Reflexes: Reflexes normal.   Psychiatric:         Mood and Affect: Mood normal.         Behavior: Behavior normal.         Thought Content:  Thought content normal.         Judgment: Judgment surgical, social, and family history as well as his   current medications and allergies were reviewed as documented in today's encounter. Medications, labs, diagnostic studies,consultations and follow-up as documented in this encounter. Return in about 3 months (around 8/19/2020) for VIDEO, HTN,HLP, LABS F/U. Patient was seen with total face to face time of  30 minutes. More than 50% of this visit was counseling and education. Future Appointments   Date Time Provider Christine Marina   8/12/2020  3:00 PM Yazmin Chand MD Psych[facility] [address] [facility]       This note was completed by using the assistance of a speech-recognition program. However, inadvertent computerized transcription errors may be present. Although every effort wasmade to ensure accuracy, no guarantees can be provided that every mistake has been identified and corrected by editing.   Electronically signed by Yazmin Chand MD on 5/20/2020 at 8:21 AM

## 2020-06-19 ENCOUNTER — PATIENT MESSAGE (OUTPATIENT)
Dept: FAMILY MEDICINE CLINIC | Age: 30
End: 2020-06-19

## 2020-06-19 RX ORDER — LOSARTAN POTASSIUM 25 MG/1
25 TABLET ORAL DAILY
Qty: 30 TABLET | Refills: 1 | Status: SHIPPED | OUTPATIENT
Start: 2020-06-19

## 2020-06-19 NOTE — TELEPHONE ENCOUNTER
From: Saritha Askew  To: Tania Dooley MD  Sent: 6/19/2020 8:01 AM EDT  Subject: Prescription Question    Keeping track of my blood pressure the last few weeks and my numbers have not seemed to change. Wondering if we can up it?

## 2020-10-28 NOTE — TELEPHONE ENCOUNTER
From: Josephine Askew  To: Ney Mcelroy MD  Sent: 1/5/2018 10:12 AM EST  Subject: RE:Smoking cessation    Can we try the Wellbutrin?  ----- Message -----  From: Ney Mcelroy MD  Sent: 1/5/2018 9:52 AM EST  To: Josephine Askew  Subject: Smoking cessation  Noman,  You could try nicotine patches, or Wellbutrin a pill. Please see below more info  Deciding About Using Medicines To Quit Smoking  Deciding About Using Medicines To Quit Smoking  What are the medicines you can use? Your doctor may prescribe varenicline (Chantix) or bupropion (Zyban). These medicines can help you cope with cravings for tobacco. They are pills that don't contain nicotine. You also can use nicotine replacement products. These do contain nicotine. There are many types. · Gum and lozenges slowly release nicotine into your mouth. · Patches stick to your skin. They slowly release nicotine into your bloodstream.  · An inhaler has a owens that contains nicotine. You breathe in a puff of nicotine vapor through your mouth and throat. · Nasal spray releases a mist that contains nicotine. What are key points about this decision? · Using medicines can double your chances of quitting smoking. They can ease cravings and withdrawal symptoms. · Getting counseling along with using medicine can raise your chances of quitting even more. · If you smoke fewer than 5 cigarettes a day, you may not need medicines to help you quit smoking. · These medicines have less nicotine than cigarettes. And by itself, nicotine is not nearly as harmful as smoking. The tars, carbon monoxide, and other toxic chemicals in tobacco cause the harmful effects. · The side effects of nicotine replacement products depend on the type of product. For example, a patch can make your skin red and itchy. Medicines in pill form can make you sick to your stomach. They can also cause dry mouth and trouble sleeping.  For most people, the side effects are not bad enough to make
pregnant & post-partum women during each pregancy irregardless of the patient's prior history of receiving Tdap to maximize the maternal antibody response and passive antibody transfer to the infant